# Patient Record
Sex: MALE | Race: OTHER
[De-identification: names, ages, dates, MRNs, and addresses within clinical notes are randomized per-mention and may not be internally consistent; named-entity substitution may affect disease eponyms.]

---

## 2021-08-22 ENCOUNTER — HOSPITAL ENCOUNTER (INPATIENT)
Dept: HOSPITAL 54 - ER | Age: 31
LOS: 1 days | Discharge: HOME | DRG: 207 | End: 2021-08-23
Attending: NURSE PRACTITIONER | Admitting: INTERNAL MEDICINE
Payer: MEDICAID

## 2021-08-22 VITALS — BODY MASS INDEX: 28.24 KG/M2 | HEIGHT: 72 IN | WEIGHT: 208.5 LBS

## 2021-08-22 VITALS — SYSTOLIC BLOOD PRESSURE: 148 MMHG | DIASTOLIC BLOOD PRESSURE: 96 MMHG

## 2021-08-22 DIAGNOSIS — Z20.822: ICD-10-CM

## 2021-08-22 DIAGNOSIS — E66.9: ICD-10-CM

## 2021-08-22 DIAGNOSIS — I51.4: Primary | ICD-10-CM

## 2021-08-22 DIAGNOSIS — F15.10: ICD-10-CM

## 2021-08-22 DIAGNOSIS — E87.6: ICD-10-CM

## 2021-08-22 DIAGNOSIS — R94.6: ICD-10-CM

## 2021-08-22 DIAGNOSIS — N17.0: ICD-10-CM

## 2021-08-22 DIAGNOSIS — R74.01: ICD-10-CM

## 2021-08-22 DIAGNOSIS — E86.0: ICD-10-CM

## 2021-08-22 DIAGNOSIS — D72.829: ICD-10-CM

## 2021-08-22 LAB
ALBUMIN SERPL BCP-MCNC: 5.1 G/DL (ref 3.4–5)
ALP SERPL-CCNC: 69 U/L (ref 46–116)
ALT SERPL W P-5'-P-CCNC: 33 U/L (ref 12–78)
APAP SERPL-MCNC: < 0 UG/ML (ref 10–30)
AST SERPL W P-5'-P-CCNC: 51 U/L (ref 15–37)
BASOPHILS # BLD AUTO: 0 K/UL (ref 0–0.2)
BASOPHILS NFR BLD AUTO: 0.3 % (ref 0–2)
BILIRUB DIRECT SERPL-MCNC: 0.5 MG/DL (ref 0–0.2)
BILIRUB SERPL-MCNC: 1.9 MG/DL (ref 0.2–1)
BUN SERPL-MCNC: 13 MG/DL (ref 7–18)
CALCIUM SERPL-MCNC: 9.7 MG/DL (ref 8.5–10.1)
CHLORIDE SERPL-SCNC: 90 MMOL/L (ref 98–107)
CO2 SERPL-SCNC: 25 MMOL/L (ref 21–32)
CREAT SERPL-MCNC: 2.8 MG/DL (ref 0.6–1.3)
EOSINOPHIL NFR BLD AUTO: 0 % (ref 0–6)
ETHANOL SERPL-MCNC: < 3 MG/DL (ref 0–0)
GLUCOSE SERPL-MCNC: 120 MG/DL (ref 74–106)
HCT VFR BLD AUTO: 46 % (ref 39–51)
HGB BLD-MCNC: 15.6 G/DL (ref 13.5–17.5)
LYMPHOCYTES NFR BLD AUTO: 1.1 K/UL (ref 0.8–4.8)
LYMPHOCYTES NFR BLD AUTO: 6.9 % (ref 20–44)
MCHC RBC AUTO-ENTMCNC: 34 G/DL (ref 31–36)
MCV RBC AUTO: 89 FL (ref 80–96)
MONOCYTES NFR BLD AUTO: 1.4 K/UL (ref 0.1–1.3)
MONOCYTES NFR BLD AUTO: 9 % (ref 2–12)
NEUTROPHILS # BLD AUTO: 13.1 K/UL (ref 1.8–8.9)
NEUTROPHILS NFR BLD AUTO: 83.8 % (ref 43–81)
PLATELET # BLD AUTO: 256 K/UL (ref 150–450)
POTASSIUM SERPL-SCNC: 3.1 MMOL/L (ref 3.5–5.1)
PROT SERPL-MCNC: 9.3 G/DL (ref 6.4–8.2)
RBC # BLD AUTO: 5.11 MIL/UL (ref 4.5–6)
SODIUM SERPL-SCNC: 131 MMOL/L (ref 136–145)
TSH SERPL DL<=0.005 MIU/L-ACNC: 4.95 UIU/ML (ref 0.36–3.74)
WBC NRBC COR # BLD AUTO: 15.6 K/UL (ref 4.3–11)

## 2021-08-22 PROCEDURE — G0378 HOSPITAL OBSERVATION PER HR: HCPCS

## 2021-08-22 PROCEDURE — C9803 HOPD COVID-19 SPEC COLLECT: HCPCS

## 2021-08-22 PROCEDURE — G0480 DRUG TEST DEF 1-7 CLASSES: HCPCS

## 2021-08-22 RX ADMIN — SODIUM CHLORIDE PRN MLS/HR: 9 INJECTION, SOLUTION INTRAVENOUS at 23:55

## 2021-08-22 NOTE — NUR
Patient arrived at 2130 brought up by ER staff. In stable condition. A&Ox4. VSS. Tele 
monitor applied. patient states that he came in for body pain and cramps that were so bad he 
could not move and profuse sweating. Reports drinking 1/4 of a liquor bottle a day plus a 
couple beers everyday for about the last 4 months since his friend was killed. Reports 
friends as his support system but lives alone. SS consult ordered. Patient also reports 
doing meth once on 8/21 but had not used for a while before then. Patient no states that he 
no longer has the cramping or any pain. No sweating. Urine dark yellow and clear. Patient is 
continent, skin intact. HR fast upon auscultation but rhythm regular, lung sounds clear, 
bowel sounds active x4. no skin issues. Oriented to unit, protocols, call light, and remote. 
safety measures in place. Will continue to monitor closely.

## 2021-08-22 NOTE — NUR
THE PATIENT IS BIBS C/O GENERALIZED BODY PAIN X TODAY. ANXIOUS PTA ADMITS TO 
METH USE. RATES GENETALIZED BODY PAIN 7/10. IN ROOM AIR AND DENIES SOB. 
RESPIRATION REGULAR AND UNLABORED. THE PATIENT IS ALERT AND ORIENTED X4. 
ATTACHED TO THE MONITOR.

## 2021-08-22 NOTE — NUR
THE PATIENT IS ALERT AND ORIENTED X4. DENIES CHEST PAIN BUT C/O GENERALIZED 
BODY PAIN OF 4/10. WILL CONTINUE TO MONITOR THE PATIENT.

## 2021-08-23 VITALS — SYSTOLIC BLOOD PRESSURE: 135 MMHG | DIASTOLIC BLOOD PRESSURE: 85 MMHG

## 2021-08-23 VITALS — DIASTOLIC BLOOD PRESSURE: 83 MMHG | SYSTOLIC BLOOD PRESSURE: 132 MMHG

## 2021-08-23 VITALS — SYSTOLIC BLOOD PRESSURE: 153 MMHG | DIASTOLIC BLOOD PRESSURE: 83 MMHG

## 2021-08-23 VITALS — SYSTOLIC BLOOD PRESSURE: 125 MMHG | DIASTOLIC BLOOD PRESSURE: 82 MMHG

## 2021-08-23 LAB
ALBUMIN SERPL BCP-MCNC: 4 G/DL (ref 3.4–5)
ALP SERPL-CCNC: 58 U/L (ref 46–116)
ALT SERPL W P-5'-P-CCNC: 28 U/L (ref 12–78)
AST SERPL W P-5'-P-CCNC: 36 U/L (ref 15–37)
BASOPHILS # BLD AUTO: 0.1 K/UL (ref 0–0.2)
BASOPHILS NFR BLD AUTO: 0.6 % (ref 0–2)
BILIRUB DIRECT SERPL-MCNC: 0.5 MG/DL (ref 0–0.2)
BILIRUB SERPL-MCNC: 2.3 MG/DL (ref 0.2–1)
BILIRUB UR QL STRIP: NEGATIVE
BUN SERPL-MCNC: 15 MG/DL (ref 7–18)
BUN SERPL-MCNC: 17 MG/DL (ref 7–18)
CALCIUM SERPL-MCNC: 8.8 MG/DL (ref 8.5–10.1)
CALCIUM SERPL-MCNC: 8.8 MG/DL (ref 8.5–10.1)
CHLORIDE SERPL-SCNC: 103 MMOL/L (ref 98–107)
CHLORIDE SERPL-SCNC: 99 MMOL/L (ref 98–107)
CHOLEST SERPL-MCNC: 172 MG/DL (ref ?–200)
CO2 SERPL-SCNC: 26 MMOL/L (ref 21–32)
CO2 SERPL-SCNC: 27 MMOL/L (ref 21–32)
COLOR UR: (no result)
CREAT SERPL-MCNC: 1.3 MG/DL (ref 0.6–1.3)
CREAT SERPL-MCNC: 1.4 MG/DL (ref 0.6–1.3)
EOSINOPHIL NFR BLD AUTO: 0.3 % (ref 0–6)
GLUCOSE SERPL-MCNC: 115 MG/DL (ref 74–106)
GLUCOSE SERPL-MCNC: 94 MG/DL (ref 74–106)
GLUCOSE UR STRIP-MCNC: NEGATIVE MG/DL
HCT VFR BLD AUTO: 42 % (ref 39–51)
HDLC SERPL-MCNC: 75 MG/DL (ref 40–60)
HGB BLD-MCNC: 14.7 G/DL (ref 13.5–17.5)
LDLC SERPL DIRECT ASSAY-MCNC: 88 MG/DL (ref 0–99)
LEUKOCYTE ESTERASE UR QL STRIP: (no result)
LYMPHOCYTES NFR BLD AUTO: 1.9 K/UL (ref 0.8–4.8)
LYMPHOCYTES NFR BLD AUTO: 24.6 % (ref 20–44)
MAGNESIUM SERPL-MCNC: 2.1 MG/DL (ref 1.8–2.4)
MCHC RBC AUTO-ENTMCNC: 35 G/DL (ref 31–36)
MCV RBC AUTO: 90 FL (ref 80–96)
MONOCYTES NFR BLD AUTO: 1 K/UL (ref 0.1–1.3)
MONOCYTES NFR BLD AUTO: 12.5 % (ref 2–12)
NEUTROPHILS # BLD AUTO: 4.9 K/UL (ref 1.8–8.9)
NEUTROPHILS NFR BLD AUTO: 62 % (ref 43–81)
NITRITE UR QL STRIP: NEGATIVE
PH UR STRIP: 6 [PH] (ref 5–8)
PHOSPHATE SERPL-MCNC: 4.9 MG/DL (ref 2.5–4.9)
PLATELET # BLD AUTO: 220 K/UL (ref 150–450)
POTASSIUM SERPL-SCNC: 3.1 MMOL/L (ref 3.5–5.1)
POTASSIUM SERPL-SCNC: 3.6 MMOL/L (ref 3.5–5.1)
PROT SERPL-MCNC: 7.6 G/DL (ref 6.4–8.2)
PROT UR QL STRIP: NEGATIVE MG/DL
RBC # BLD AUTO: 4.7 MIL/UL (ref 4.5–6)
RBC #/AREA URNS HPF: (no result) /HPF (ref 0–2)
SODIUM SERPL-SCNC: 137 MMOL/L (ref 136–145)
SODIUM SERPL-SCNC: 140 MMOL/L (ref 136–145)
TRIGL SERPL-MCNC: 116 MG/DL (ref 30–150)
UROBILINOGEN UR STRIP-MCNC: 0.2 EU/DL
WBC #/AREA URNS HPF: (no result) /HPF (ref 0–3)
WBC NRBC COR # BLD AUTO: 7.9 K/UL (ref 4.3–11)

## 2021-08-23 RX ADMIN — SODIUM CHLORIDE PRN MLS/HR: 9 INJECTION, SOLUTION INTRAVENOUS at 12:34

## 2021-08-23 RX ADMIN — POTASSIUM CHLORIDE SCH MEQ: 1500 TABLET, EXTENDED RELEASE ORAL at 09:37

## 2021-08-23 RX ADMIN — POTASSIUM CHLORIDE SCH MEQ: 1500 TABLET, EXTENDED RELEASE ORAL at 10:32

## 2021-08-23 NOTE — NUR
TELE RN DISCHARGE NOTE



PATIENT DISCHARGED VIA PRIVATE CAR @ 7822. STABLE, A/O X4. NO SOB NOTED. NO 
DISTRESS/DISCOMFORT/PAIN NOTED AT THIS TIME. ALL EXITCARE AND PATIENT EDUCATION REVIEWED 
WITH AND GIVEN TO PATIENT. PATIENT VERBALIZED UNDERSTANDING. IV ACCESS REMOVED. WRISTBAND 
REMOVED. TELE MONITOR REMOVED. PATIENT ACCOMPANIED TO LOBBY ON FOOT BY MYSELF. PICKED UP BY 
BROTHER. MD AND CHARGE NURSE AWARE.

## 2021-08-23 NOTE — NUR
consultation:



 consultation requested for ETOH and meth use.  Per ED physician's notes, 
presented to the emergency department on 08/22/2021 describing a couple days of myalgias and 
body spasm.  Patient states that he did use methamphetamines 2 days ago.  He denies any drug 
use since then.  This LCSW met with the patient bedside in his hospital room.  Patient is a 
30 year old -American male, awake, alert, oriented x 4, receptive to meeting with 
this LCSW.  Patient was pleasant and cooperative, engaged appropriately in this interview.  
Patient stated that he came to the ED for dehydration, body pain and spasms.  Patient lives 
alone at [address] [place] [address]. [address] [address].  Patient is independent with ADL's, 
and works for a moving company.  Patient denied having any medical problems, however 
reported use of alcohol, marijuana, and amphetamines.  Patient stated he smokes marijuana on 
a daily basis, drinks several cans of beer and a couple of shots of tequila on a daily 
basis, and was using amphetamines in the past, but had not used for almost 5 months until he 
used again about 2-3 days before this hospitalization.  Patient denies use of cigarettes.  
Patient denies hx of mental illness.  This LCSW inquired whether patient had previously been 
in treatment for substance abuse, and patient stated he has not.  This LCSW offered 
substance abuse treatment resources, and patient was receptive to accepting the resources.  
Discharge plans discussed, and patient stated that he would be returning home once he is 
discharged from the hospital. Patient stated that either his mother, Kelsey Rodriguez 
[phone] or his brother Liang Mix [phone] would be able to pick him up from the 
hospital at time of discharge.  

The following resource were provided to the patient:  



[facility] Self-[facility]pl[facility] ([facility])                  

Contact number [phone].

  

[facility]

[address] [place] [address] [phone]

      

[facility]

[address] [facility] [place] [address] [phone]



[facility] ([facility] based)

[address] [place] [address] [phone]



[facility] (No insurance required)

[address] [place]

[place] [address]

[phone] 



[facility] 

[address] [facility] [id number] [phone] 

       



[facility] 

[phone]  

[address] [address]

## 2021-08-23 NOTE — NUR
TELE RN CLOSING NOTES

Patient is A&Ox4. VSS. On monitor patients HR started around 100bpm but improved and now 
around 70bpm. Monitor reads junctional rhythm with inverted P-waves -same as on ER EKG. RAC 
and R hand #18G IVs flushed and patient. IV NS currently infusing to R hand IV at 75cc/hr. 
No issues overnight since admission. Patient is able to make needs known. NPO since midnight 
for cardio consult in AM -to hold breakfast until cleared. Sterile specimen cup at bedside. 
Pt educated urine needed for UA.